# Patient Record
(demographics unavailable — no encounter records)

---

## 2025-01-21 NOTE — HISTORY OF PRESENT ILLNESS
[de-identified] : Fever [FreeTextEntry6] : 5 days fever to 102.  Severe cough.  Sibling with strep.  Had Flu 2 weeks ago per Urgent Care report.

## 2025-01-21 NOTE — PHYSICAL EXAM
[Palpated at following regions:] : palpated at following regions: [Preauricular] : preauricular [Post Auricular] : post auricular [Occipital] : occipital [Parotid] : parotid [Retropharyngeal] : retropharyngeal [Submandibular] : submandibular [Submental] : submental [Anterior Cervical] : anterior cervical [Posterior Cervical] : posterior cervical [Axillary] : axillary [Supraclavicular] : supraclavicular [Inguinal] : inguinal [NL] : warm, clear [de-identified] : Bilateral freely movable soft anterior cervical lymphadenopathy with largest node approximately 1 cm in diameter [de-identified] : Normal

## 2025-01-21 NOTE — PLAN
[TextEntry] : Advised to avoid physical activity and any activity with risk of abdominal trauma.  Further plans pending

## 2025-01-23 NOTE — PLAN
[TextEntry] : Also, continue current care.  Avoiding gym, sports, and any activity with risk of abdominal trauma until further notice.

## 2025-01-23 NOTE — HISTORY OF PRESENT ILLNESS
[de-identified] : Pneumonia, mononucleosis, strep, cervical lymphadenopathy [FreeTextEntry6] : Cough.  No fever for 2 days.  No other problems.

## 2025-02-03 NOTE — PHYSICAL EXAM
[Well Nourished] : well nourished [Well Developed] : well developed [Alert] : ~L alert [Active] : active [Normal Breathing Pattern] : normal breathing pattern [No Respiratory Distress] : no respiratory distress [No Allergic Shiners] : no allergic shiners [No Drainage] : no drainage [No Conjunctivitis] : no conjunctivitis [No Nasal Drainage] : no nasal drainage [No Sinus Tenderness] : no sinus tenderness [No Oral Pallor] : no oral pallor [No Oral Cyanosis] : no oral cyanosis [No Stridor] : no stridor [Absence Of Retractions] : absence of retractions [Symmetric] : symmetric [Good Expansion] : good expansion [No Acc Muscle Use] : no accessory muscle use [Good aeration to bases] : good aeration to bases [Equal Breath Sounds] : equal breath sounds bilaterally [No Crackles] : no crackles [No Rhonchi] : no rhonchi [No Wheezing] : no wheezing [Normal Sinus Rhythm] : normal sinus rhythm [No Heart Murmur] : no heart murmur [Soft, Non-Tender] : soft, non-tender [Non Distended] : was not ~L distended [Full ROM] : full range of motion [No Clubbing] : no clubbing [Capillary Refill < 2 secs] : capillary refill less than two seconds [No Cyanosis] : no cyanosis [No Petechiae] : no petechiae [No Contractures] : no contractures [Abnormal Walk] : normal gait [Alert and  Oriented] : alert and oriented [No Abnormal Focal Findings] : no abnormal focal findings [Well Groomed] : well groomed [FreeTextEntry3] : external normal  [FreeTextEntry5] : MMM [de-identified] : no visible rashes on exposed skin

## 2025-02-03 NOTE — REVIEW OF SYSTEMS
[NI] : Genitourinary  [Nl] : Endocrine [Eczema] : eczema [Nasal Congestion] : no nasal congestion [Recurrent Ear Infections] : no recurrent ear infections [Recurrent Throat Infections] : no recurrent throat infections [Pneumonia] : pneumonia [Problems Swallowing] : no problems swallowing [Reflux] : no reflux [Food Intolerance] : food tolerant [de-identified] : cashew and pistachio

## 2025-02-03 NOTE — CONSULT LETTER
[Dear  ___] : Dear  [unfilled], [Consult Letter:] : I had the pleasure of evaluating your patient, [unfilled]. [Please see my note below.] : Please see my note below. [Consult Closing:] : Thank you very much for allowing me to participate in the care of this patient.  If you have any questions, please do not hesitate to contact me. [Sincerely,] : Sincerely, [FreeTextEntry2] : Devyn Balderas MD [FreeTextEntry3] : Jasmin Cade MD\par  Director, [facility]- Pediatric Pulmonology\par  [facility] or [place]\par  , Department of Pediatrics, [facility]

## 2025-02-03 NOTE — HISTORY OF PRESENT ILLNESS
[FreeTextEntry1] : This is a 5 year old male for f/u of RAD/asthma.  +fhx atopy.  Dad with possible new dx of asthma.   Interval hospitalizations: no Interval ER visits:  1 () on vacation in October Interval steroids courses: 1 at   Controller medications: off for first winter this year, prn use in October illness and then again with pna - now off Frequency of rescue medication:   as above Using spacer: Yes  Interval sx: noted this weekend seemed winded with running around at party Other interval medical history:  1st PNA- 1/21/25 - cough, fever, +CXR (LLL), took zithro and cephalexin and resolved, just prior also dx flu - resovled fully prior to pna, strep throat and mono Current respiratory sx: none   9/24 Interval hospitalizations: no Interval ER visits: no Interval steroids courses: no Controller medications: off since 4/24 Frequency of rescue medication:   none Using spacer: Yes  Interval sx: none Other interval medical history:  none Current respiratory sx: cold last week, better after 2 days now worse again (cough, not distress), no meds   11/23 Interval hospitalizations: no Interval ER visits: no Interval steroids courses: no Controller medications: stopped Flovent mid April, restarted Labor day Frequency of rescue medication:  with colds, last in June when on vacation.  Tolerate fall colds with mild resp sx  Using spacer: Yes  Interval sx: none Other interval medical history:  saw ENT for perf'd TM Current respiratory sx: none   5/23 This is a 3  year old male for f/u of RAD/asthma.   Interval hospitalizations: no Interval ER visits: no Interval steroids courses: no Controller medications: stopped Flovent mid April Frequency of rescue medication:  with colds, none recent  Using spacer: Yes  Interval sx: none Other interval medical history:  none Current respiratory sx: none     1/23 This is a 3 year old male for f/u of RAD/asthma.   Interval hospitalizations: no Interval ER visits: no Interval steroids courses: no Controller medications: Flovent 44 2p bid Frequency of rescue medication:  with colds Using spacer: Yes  Interval sx: none Other interval medical history: interval course of Augmentin for flu/covid negative sinusitis in 11/22 (fever cough, copies nasal congestion, clear lung on review of note) - suspected Adeno at the time (mother and infant brother +) Current respiratory sx: none    10/22 Thinks the Flovent is helping.  Worried about upcoming RSV season in setting of 2 prior RSV illnesses.    One interval cold, lasted about 1 week, did plan as  previously discussed with alb.  tolerated well.    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  Remained positive on PCR testing at time of RSV/rhino  Age of onset of respiratory sx: RSV in July 2021-no nebs given, starting at age 2 with recurrent cough Dx with asthma/RAD: following 6/22 admission  Hospitalization:  6/22 hospitalized x 2 nights with bounce back ED visit and then needed a second course of prednisolone  all in setting of RSV and R/E ED visits: as above Steroid courses: as above Typical sx: cough  Typical trigger: URI/viral exercise Response to albuterol: yes - "definitely" - makes cough go away temporarily  Response to oral steroids: good Controller meds: used budesonide in past (unclear for how long, didn't think as helpful as albuterol) Interval sx: no exercise intolerance  +nocturnal cough can cough up mucous  Atopic sx: +eczema, no food allergies, no known envt allergies but has appt with allergy to check  GI sx: no reflux sx, no swallowing concerns  ENT sx: no chronic congestion, rare snoring fhx: +asthma in extended family members, parents with environmental allergies  Current sx: improving from recent RSV/R-E illness but residual cough, still using alb tid and responding well to it

## 2025-02-14 NOTE — PHYSICAL EXAM
[Palpated at following regions:] : palpated at following regions: [Preauricular] : preauricular [Post Auricular] : post auricular [Occipital] : occipital [Parotid] : parotid [Retropharyngeal] : retropharyngeal [Submandibular] : submandibular [Submental] : submental [Anterior Cervical] : anterior cervical [Axillary] : axillary [Supraclavicular] : supraclavicular [Inguinal] : inguinal [NL] : warm, clear [de-identified] : No lymphadenopathy

## 2025-03-28 NOTE — HISTORY OF PRESENT ILLNESS
[Mother] : mother [Normal] : Normal [No] : Not at  exposure [Water heater temperature set at <120 degrees F] : Water heater temperature set at <120 degrees F [Car seat in back seat] : Car seat in back seat [Carbon Monoxide Detectors] : Carbon monoxide detectors [Smoke Detectors] : Smoke detectors [Supervised outdoor play] : Supervised outdoor play [Up to date] : Up to date [NO] : No [Exposure to electronic nicotine delivery system] : No exposure to electronic nicotine delivery system

## 2025-03-28 NOTE — PHYSICAL EXAM
[Alert] : alert [No Acute Distress] : no acute distress [Normocephalic] : normocephalic [Conjunctivae with no discharge] : conjunctivae with no discharge [PERRL] : PERRL [EOMI Bilateral] : EOMI bilateral [Auricles Well Formed] : auricles well formed [Clear Tympanic membranes with present light reflex and bony landmarks] : clear tympanic membranes with present light reflex and bony landmarks [No Discharge] : no discharge [Nares Patent] : nares patent [Pink Nasal Mucosa] : pink nasal mucosa [Palate Intact] : palate intact [Nonerythematous Oropharynx] : nonerythematous oropharynx [Supple, full passive range of motion] : supple, full passive range of motion [No Palpable Masses] : no palpable masses [Symmetric Chest Rise] : symmetric chest rise [Clear to Auscultation Bilaterally] : clear to auscultation bilaterally [Regular Rate and Rhythm] : regular rate and rhythm [Normal S1, S2 present] : normal S1, S2 present [No Murmurs] : no murmurs [+2 Femoral Pulses] : +2 femoral pulses [Soft] : soft [NonTender] : non tender [Non Distended] : non distended [Normoactive Bowel Sounds] : normoactive bowel sounds [No Hepatomegaly] : no hepatomegaly [No Splenomegaly] : no splenomegaly [Butch: _____] : Butch [unfilled] [Central Urethral Opening] : central urethral opening [Testicles Descended Bilaterally] : testicles descended bilaterally [No Abnormal Lymph Nodes Palpated] : no abnormal lymph nodes palpated [No Gait Asymmetry] : no gait asymmetry [No pain or deformities with palpation of bone, muscles, joints] : no pain or deformities with palpation of bone, muscles, joints [Normal Muscle Tone] : normal muscle tone [Straight] : straight [Cranial Nerves Grossly Intact] : cranial nerves grossly intact [No Rash or Lesions] : no rash or lesions

## 2025-06-03 NOTE — PHYSICAL EXAM
[Butch: ____] : Butch [unfilled] [Normal external genitalia] : normal external genitalia [NL] : warm, clear [de-identified] : Unable to bear weight on left leg; no visible or palpable abnormalitites [de-identified] : Normal

## 2025-06-03 NOTE — PLAN
[TextEntry] : Sent immediately directly from here to [facility] Emergency Department for further evaluation.

## 2025-06-03 NOTE — HISTORY OF PRESENT ILLNESS
[de-identified] : Knee pain [FreeTextEntry6] : Had left knee pain yesterday morning upon awakening which lasted approximately 20 minutes.  Vomited 4 time starting last night.  Today awoke with left knee pain which is persisting.